# Patient Record
(demographics unavailable — no encounter records)

---

## 2025-01-02 NOTE — HISTORY OF PRESENT ILLNESS
[de-identified] : Very pleasant new patient here to establish care with the following concerns: Experiencing extreme fatigue during the daytime even after sleeping 9 to 12 hours a day.  Has struggled with waking up during the middle of the night but symptoms persist even when she sleeps well.  Denies depression or anxiety symptoms.  Denies history of anemia or heavy menstrual periods.  Denies rashes or joint pains or any other physical symptoms. Has phobia of blood draws Student at [employer]

## 2025-01-02 NOTE — ASSESSMENT
[FreeTextEntry1] : Will do lab work up and follow up with results A1c and lipids added on while we are doing blood draw given phobia of needles

## 2025-01-02 NOTE — HEALTH RISK ASSESSMENT
[Good] : ~his/her~  mood as  good [Yes] : Yes [2 - 4 times a month (2 pts)] : 2-4 times a month (2 points) [1 or 2 (0 pts)] : 1 or 2 (0 points) [Never (0 pts)] : Never (0 points) [No] : In the past 12 months have you used drugs other than those required for medical reasons? No [No falls in past year] : Patient reported no falls in the past year [0] : 2) Feeling down, depressed, or hopeless: Not at all (0) [PHQ-2 Negative - No further assessment needed] : PHQ-2 Negative - No further assessment needed [Patient reported PAP Smear was normal] : Patient reported PAP Smear was normal [With Significant Other] : lives with significant other [Single] : single [Feels Safe at Home] : Feels safe at home [Smoke Detector] : smoke detector [Carbon Monoxide Detector] : carbon monoxide detector [Seat Belt] :  uses seat belt [Never] : Never [Audit-CScore] : 2 [[id number]] : 0 [Change in mental status noted] : No change in mental status noted [Reports changes in hearing] : Reports no changes in hearing [Reports changes in vision] : Reports no changes in vision [Reports changes in dental health] : Reports no changes in dental health [PapSmearDate] : 01/2023

## 2025-01-16 NOTE — PHYSICAL EXAM
[Chaperone Present] : A chaperone was present in the examining room during all aspects of the physical examination [[id number]] : A chaperone was present during the pelvic exam. [FreeTextEntry2] : PB Bender [Awake] : awake [Alert] : alert [Acute Distress] : no acute distress [Mass] : no breast mass [Nipple Discharge] : no nipple discharge [Axillary LAD] : no axillary lymphadenopathy [Soft] : soft [Tender] : non tender [Oriented x3] : oriented to person, place, and time [Normal] : uterus [No Bleeding] : there was no active vaginal bleeding [Uterine Adnexae] : were not tender and not enlarged

## 2025-01-16 NOTE — DISCUSSION/SUMMARY
[FreeTextEntry1] : Advised seeing genetic counsellor given family history of breast cancer and starting routine screening mammograms at age 35